# Patient Record
Sex: FEMALE | Race: OTHER | HISPANIC OR LATINO | URBAN - METROPOLITAN AREA
[De-identification: names, ages, dates, MRNs, and addresses within clinical notes are randomized per-mention and may not be internally consistent; named-entity substitution may affect disease eponyms.]

---

## 2018-06-20 ENCOUNTER — EMERGENCY (EMERGENCY)
Facility: HOSPITAL | Age: 63
LOS: 1 days | Discharge: HOME | End: 2018-06-20
Attending: EMERGENCY MEDICINE | Admitting: EMERGENCY MEDICINE
Payer: COMMERCIAL

## 2018-06-20 VITALS
SYSTOLIC BLOOD PRESSURE: 150 MMHG | RESPIRATION RATE: 18 BRPM | TEMPERATURE: 98 F | HEART RATE: 85 BPM | OXYGEN SATURATION: 97 % | DIASTOLIC BLOOD PRESSURE: 87 MMHG

## 2018-06-20 VITALS
TEMPERATURE: 98 F | RESPIRATION RATE: 18 BRPM | OXYGEN SATURATION: 96 % | DIASTOLIC BLOOD PRESSURE: 76 MMHG | SYSTOLIC BLOOD PRESSURE: 130 MMHG | HEART RATE: 89 BPM

## 2018-06-20 DIAGNOSIS — F43.10 POST-TRAUMATIC STRESS DISORDER, UNSPECIFIED: ICD-10-CM

## 2018-06-20 DIAGNOSIS — F41.1 GENERALIZED ANXIETY DISORDER: ICD-10-CM

## 2018-06-20 DIAGNOSIS — R69 ILLNESS, UNSPECIFIED: ICD-10-CM

## 2018-06-20 DIAGNOSIS — F10.10 ALCOHOL ABUSE, UNCOMPLICATED: ICD-10-CM

## 2018-06-20 LAB
ALBUMIN SERPL ELPH-MCNC: 4.4 G/DL — SIGNIFICANT CHANGE UP (ref 3.3–5)
ALP SERPL-CCNC: 73 U/L — SIGNIFICANT CHANGE UP (ref 40–120)
ALT FLD-CCNC: 24 U/L — SIGNIFICANT CHANGE UP (ref 10–45)
ANION GAP SERPL CALC-SCNC: 14 MMOL/L — SIGNIFICANT CHANGE UP (ref 5–17)
APPEARANCE UR: CLEAR — SIGNIFICANT CHANGE UP
AST SERPL-CCNC: 31 U/L — SIGNIFICANT CHANGE UP (ref 10–40)
BASOPHILS NFR BLD AUTO: 0.2 % — SIGNIFICANT CHANGE UP (ref 0–2)
BILIRUB SERPL-MCNC: 0.3 MG/DL — SIGNIFICANT CHANGE UP (ref 0.2–1.2)
BILIRUB UR-MCNC: NEGATIVE — SIGNIFICANT CHANGE UP
BUN SERPL-MCNC: 15 MG/DL — SIGNIFICANT CHANGE UP (ref 7–23)
CALCIUM SERPL-MCNC: 10 MG/DL — SIGNIFICANT CHANGE UP (ref 8.4–10.5)
CHLORIDE SERPL-SCNC: 92 MMOL/L — LOW (ref 96–108)
CO2 SERPL-SCNC: 29 MMOL/L — SIGNIFICANT CHANGE UP (ref 22–31)
COLOR SPEC: YELLOW — SIGNIFICANT CHANGE UP
CREAT SERPL-MCNC: 0.83 MG/DL — SIGNIFICANT CHANGE UP (ref 0.5–1.3)
DIFF PNL FLD: NEGATIVE — SIGNIFICANT CHANGE UP
EOSINOPHIL NFR BLD AUTO: 0.6 % — SIGNIFICANT CHANGE UP (ref 0–6)
ETHANOL SERPL-MCNC: <10 MG/DL — SIGNIFICANT CHANGE UP (ref 0–10)
GLUCOSE SERPL-MCNC: 150 MG/DL — HIGH (ref 70–99)
GLUCOSE UR QL: NEGATIVE — SIGNIFICANT CHANGE UP
HCT VFR BLD CALC: 40.4 % — SIGNIFICANT CHANGE UP (ref 34.5–45)
HGB BLD-MCNC: 13.8 G/DL — SIGNIFICANT CHANGE UP (ref 11.5–15.5)
KETONES UR-MCNC: NEGATIVE — SIGNIFICANT CHANGE UP
LEUKOCYTE ESTERASE UR-ACNC: NEGATIVE — SIGNIFICANT CHANGE UP
LYMPHOCYTES # BLD AUTO: 30.9 % — SIGNIFICANT CHANGE UP (ref 13–44)
MCHC RBC-ENTMCNC: 29.3 PG — SIGNIFICANT CHANGE UP (ref 27–34)
MCHC RBC-ENTMCNC: 34.2 G/DL — SIGNIFICANT CHANGE UP (ref 32–36)
MCV RBC AUTO: 85.8 FL — SIGNIFICANT CHANGE UP (ref 80–100)
MONOCYTES NFR BLD AUTO: 5.4 % — SIGNIFICANT CHANGE UP (ref 2–14)
NEUTROPHILS NFR BLD AUTO: 62.9 % — SIGNIFICANT CHANGE UP (ref 43–77)
NITRITE UR-MCNC: NEGATIVE — SIGNIFICANT CHANGE UP
PCP SPEC-MCNC: SIGNIFICANT CHANGE UP
PH UR: 6 — SIGNIFICANT CHANGE UP (ref 5–8)
PLATELET # BLD AUTO: 336 K/UL — SIGNIFICANT CHANGE UP (ref 150–400)
POTASSIUM SERPL-MCNC: 4 MMOL/L — SIGNIFICANT CHANGE UP (ref 3.5–5.3)
POTASSIUM SERPL-SCNC: 4 MMOL/L — SIGNIFICANT CHANGE UP (ref 3.5–5.3)
PROT SERPL-MCNC: 7.7 G/DL — SIGNIFICANT CHANGE UP (ref 6–8.3)
PROT UR-MCNC: NEGATIVE MG/DL — SIGNIFICANT CHANGE UP
RBC # BLD: 4.71 M/UL — SIGNIFICANT CHANGE UP (ref 3.8–5.2)
RBC # FLD: 13.6 % — SIGNIFICANT CHANGE UP (ref 10.3–16.9)
SODIUM SERPL-SCNC: 135 MMOL/L — SIGNIFICANT CHANGE UP (ref 135–145)
SP GR SPEC: 1.01 — SIGNIFICANT CHANGE UP (ref 1–1.03)
TSH SERPL-MCNC: 0.6 UIU/ML — SIGNIFICANT CHANGE UP (ref 0.35–4.94)
UROBILINOGEN FLD QL: 0.2 E.U./DL — SIGNIFICANT CHANGE UP
WBC # BLD: 11.1 K/UL — HIGH (ref 3.8–10.5)
WBC # FLD AUTO: 11.1 K/UL — HIGH (ref 3.8–10.5)

## 2018-06-20 PROCEDURE — 81003 URINALYSIS AUTO W/O SCOPE: CPT

## 2018-06-20 PROCEDURE — 84443 ASSAY THYROID STIM HORMONE: CPT

## 2018-06-20 PROCEDURE — 80053 COMPREHEN METABOLIC PANEL: CPT

## 2018-06-20 PROCEDURE — 36415 COLL VENOUS BLD VENIPUNCTURE: CPT

## 2018-06-20 PROCEDURE — 93010 ELECTROCARDIOGRAM REPORT: CPT

## 2018-06-20 PROCEDURE — 90792 PSYCH DIAG EVAL W/MED SRVCS: CPT

## 2018-06-20 PROCEDURE — 85025 COMPLETE CBC W/AUTO DIFF WBC: CPT

## 2018-06-20 PROCEDURE — 93005 ELECTROCARDIOGRAM TRACING: CPT

## 2018-06-20 PROCEDURE — 99285 EMERGENCY DEPT VISIT HI MDM: CPT | Mod: 25

## 2018-06-20 PROCEDURE — 80307 DRUG TEST PRSMV CHEM ANLYZR: CPT

## 2018-06-20 PROCEDURE — 99284 EMERGENCY DEPT VISIT MOD MDM: CPT | Mod: 25

## 2018-06-20 NOTE — ED BEHAVIORAL HEALTH ASSESSMENT NOTE - OTHER PAST PSYCHIATRIC HISTORY (INCLUDE DETAILS REGARDING ONSET, COURSE OF ILLNESS, INPATIENT/OUTPATIENT TREATMENT)
pt states she was seeing a psychiatrist many years ago who was prescribing TCA and Valium at the time.

## 2018-06-20 NOTE — ED BEHAVIORAL HEALTH ASSESSMENT NOTE - SUMMARY
Pt is a 61 yo  single female, domiciled alone, unemployed, with likely hx/o anxiety/PTSD and ETOH use disorder, who currently presents with vague complaints of anxiety, hx/o trauma, seeking to establish outpatient psychiatric care. Pt is somewhat tangential during the interview, difficult to keep on topic, focused on hx/o trauma and ongoing anxiety. Pt presented to the ED requesting to establish outpatient mental health care. She declines inpatient psychiatric admission. Pt's presentation is suggestive of anxiety, PTSD, likely personality pathology in context of ongoing ETOH use. Pt would greatly benefit from establishing outpatient mental health treatment. Referrals were provided to pt. Pt denies SI/plan or intent. She is not in acute danger to self or others.

## 2018-06-20 NOTE — ED PROVIDER NOTE - PROGRESS NOTE DETAILS
Pt seen by psych, no indication for admission. Pt needs to follow up as outpt, no new meds at this time. She was given a list of outpatient clinics for follow up. She no longer needs to be on a 1:1. SW was consulted and will be down to give further resources. Pt was advised she can be DC'd and leave if she doesn't want to wait for social work.
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
## 2018-06-20 NOTE — ED ADULT NURSE REASSESSMENT NOTE - NS ED NURSE REASSESS COMMENT FT1
pt walking around ED stating "I'm leaving, I need to eat and I'm going to get a cigarette." pt attempting to leave. pt agrees to wait for social work and then be discharged.

## 2018-06-20 NOTE — ED BEHAVIORAL HEALTH ASSESSMENT NOTE - AXIS IV
Housing problems/Problems with primary support/Problem related to social environment/Educational problems/Occupational problems/Economic problems/Problems with access to healthcare services

## 2018-06-20 NOTE — ED ADULT NURSE NOTE - FALL HARM RISK TYPE OF ASSESSMENT
Per pharmacist at [facility] ([address])  insurance will only cover benzoyl peroxide topical wash 10%.   The 2.5% and 5% are OTC, so insurance will not cover.   Call placed to mother. Pt will need to purchase the benzoyl peroxide topical wash 2.5% OTC.  No answer. Left a message to call back.        Admission
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
## 2018-06-20 NOTE — ED BEHAVIORAL HEALTH ASSESSMENT NOTE - RISK ASSESSMENT
Pt is at chronic self harm risk given ongoing psychosocial stressors, anxiety, ETOh use, and lack of ongoing mental health treatment
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
## 2018-06-20 NOTE — ED PROVIDER NOTE - PHYSICAL EXAMINATION
GEN: Anxious, WD, well nourished, awake, alert, oriented to person, place, time/situation, NTAF.  ENT: Airway patent, Nasal mucosa clear. Mouth with normal mucosa. No bony deformities, no fractures palpated, no loose teeth,   EYES: Clear bilaterally. perrl, eomi  RESPIRATORY: Breathing comfortably with normal RR.  CARDIAC: Regular rate and rhythm  ABDOMEN: Soft, nontender, +bowel sounds, no rebound, rigidity, or guarding.  MSK: Range of motion is not limited, no deformities noted. No midline c-spine TTP.  NEURO: Alert and oriented x 3. Cn 2-12 intact. Strength 5/5 and sensation intact in all 4 extremities. no pronator drift. Gait normal.   SKIN: Skin normal color for race, warm, dry and intact. No evidence of rash.  PSYCH: Alert and oriented to person, place, time/situation. anxious, depressed, tangential.
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
## 2018-06-24 DIAGNOSIS — F41.9 ANXIETY DISORDER, UNSPECIFIED: ICD-10-CM

## 2018-06-24 DIAGNOSIS — F17.200 NICOTINE DEPENDENCE, UNSPECIFIED, UNCOMPLICATED: ICD-10-CM
